# Patient Record
Sex: MALE | Race: WHITE | Employment: OTHER | URBAN - METROPOLITAN AREA
[De-identification: names, ages, dates, MRNs, and addresses within clinical notes are randomized per-mention and may not be internally consistent; named-entity substitution may affect disease eponyms.]

---

## 2023-02-01 RX ORDER — ASPIRIN 81 MG/1
81 TABLET ORAL DAILY
COMMUNITY

## 2023-02-01 RX ORDER — GLIMEPIRIDE 4 MG/1
4 TABLET ORAL 2 TIMES DAILY
COMMUNITY

## 2023-02-01 RX ORDER — LISINOPRIL AND HYDROCHLOROTHIAZIDE 20; 12.5 MG/1; MG/1
1 TABLET ORAL DAILY
COMMUNITY

## 2023-02-01 RX ORDER — ATORVASTATIN CALCIUM 40 MG/1
40 TABLET, FILM COATED ORAL DAILY
COMMUNITY

## 2023-02-01 NOTE — PROGRESS NOTES
Patient will be called with an arrival time on 2/6/2023 for his procedure at [address] [place] [address] on 2/7/2023               1. Do not eat or drink anything after midnight - unless instructed by your doctor prior to surgery. This includes                   no water, chewing gum or mints. 2. Follow your directions as prescribed by the doctor for your procedure and medications. 3. Check with your Doctor regarding stopping vitamins, supplements, blood thinners and follow their instructions. Stop vitamins, supplements and NSAIDS:    4. Do not smoke, vape or use chewing tobacco morning of surgery. Do not drink any alcoholic beverages 24 hours prior to surgery. This includes NA Beer. No street drugs 7 days prior to surgery. 5. You may brush your teeth and gargle the morning of surgery. DO NOT SWALLOW WATER   6. You MUST make arrangements for a responsible adult to take you home after your surgery and be able to check on you every couple                   hours for the day. You will not be allowed to leave alone or drive yourself home. It is strongly suggested someone stay with you the first 24                   hrs. Your surgery will be cancelled if you do not have a ride home. 7. Please wear simple, loose fitting clothing to the hospital.  Garcia Greco not bring valuables (money, credit cards, checkbooks, etc.) Do not wear any                   makeup (including no eye makeup) or nail polish on your fingers or toes. 8. DO NOT wear any jewelry or piercings on day of surgery. All body piercing jewelry must be removed. 9. If you have dentures, they will be removed before going to the OR; we will provide you a container. If you wear contact lenses or glasses,                  they will be removed; please bring a case for them. 10. If you  have a Living Will and Durable Power of  for Healthcare, please bring in a copy.            11. Please bring picture ID,  insurance card, paperwork from the doctors office    (H & P, Consent, & card for implantable devices). 12. Take a shower with Hibiclens or an antibacterial soap the night before your surgery (put clean sheets on your bed). Take a 2nd shower with the antibacterial soap the morning of surgery. Do not apply any make-up, deodorant, lotion, oil or powder after the    morning shower. 15.  Enter thru the main entrance on the day of surgery. Patient will take his metformin the morning of his procedure, he has no questions concerning colon prep instructions at this time.

## 2023-02-06 ENCOUNTER — ANESTHESIA EVENT (OUTPATIENT)
Dept: ENDOSCOPY | Age: 68
End: 2023-02-06
Payer: MEDICARE

## 2023-02-06 NOTE — PROGRESS NOTES
Left message for patient to arrive at 0900 at [facility] on 2/7/2023 for his procedure at 1030. IV order in epic, placed by Elsie ABBOTT.

## 2023-02-06 NOTE — ANESTHESIA PRE PROCEDURE
Department of Anesthesiology  Preprocedure Note       Name:  Lanny Ford   Age:  74 y.o.  :  1955                                          MRN:  [id number]         Date:  2023      Surgeon: Justyn Orta):  Adam Villalpando MD    Procedure: Procedure(s):  COLORECTAL CANCER SCREENING, NOT HIGH RISK    Medications prior to admission:   Prior to Admission medications    Medication Sig Start Date End Date Taking? Authorizing Provider   CINNAMON PO Take by mouth daily   Yes Historical Provider, MD   Potassium 99 MG TABS Take by mouth daily   Yes Historical Provider, MD   MULTIPLE VITAMIN PO Take by mouth daily   Yes Historical Provider, MD   aspirin 81 MG EC tablet Take 81 mg by mouth daily   Yes Historical Provider, MD   atorvastatin (LIPITOR) 40 MG tablet Take 40 mg by mouth daily   Yes Historical Provider, MD   dapagliflozin (FARXIGA) 5 MG tablet Take 5 mg by mouth every morning   Yes Historical Provider, MD   glimepiride (AMARYL) 4 MG tablet Take 4 mg by mouth 2 times daily   Yes Historical Provider, MD   SITagliptin (JANUVIA) 100 MG tablet Take 100 mg by mouth daily   Yes Historical Provider, MD   lisinopril-hydroCHLOROthiazide (PRINZIDE;ZESTORETIC) 20-12.5 MG per tablet Take 1 tablet by mouth daily   Yes Historical Provider, MD   metFORMIN (GLUCOPHAGE) 850 MG tablet Take 850 mg by mouth 2 times daily (with meals)   Yes Historical Provider, MD       Current medications:    No current facility-administered medications for this encounter.      Current Outpatient Medications   Medication Sig Dispense Refill    CINNAMON PO Take by mouth daily      Potassium 99 MG TABS Take by mouth daily      MULTIPLE VITAMIN PO Take by mouth daily      aspirin 81 MG EC tablet Take 81 mg by mouth daily      atorvastatin (LIPITOR) 40 MG tablet Take 40 mg by mouth daily      dapagliflozin (FARXIGA) 5 MG tablet Take 5 mg by mouth every morning      glimepiride (AMARYL) 4 MG tablet Take 4 mg by mouth 2 times daily      SITagliptin (JANUVIA) 100 MG tablet Take 100 mg by mouth daily      lisinopril-hydroCHLOROthiazide (PRINZIDE;ZESTORETIC) 20-12.5 MG per tablet Take 1 tablet by mouth daily      metFORMIN (GLUCOPHAGE) 850 MG tablet Take 850 mg by mouth 2 times daily (with meals)         Allergies: Allergies   Allergen Reactions    Bee Pollen Anaphylaxis    Epinephrine      Hives and rash.  Other      Strawberries cause a rash. Problem List:  There is no problem list on file for this patient. Past Medical History:        Diagnosis Date    Diabetes mellitus ([address]     Hyperlipidemia     Hypertension     Sleep apnea        Past Surgical History:        Procedure Laterality Date    APPENDECTOMY      HAND SURGERY Left        Social History:    Social History     Tobacco Use    Smoking status: Former     Types: Cigarettes    Smokeless tobacco: Never   Substance Use Topics    Alcohol use: Yes     Comment: rare                                Counseling given: Not Answered      Vital Signs (Current):   Vitals:    02/01/23 1036   Weight: 290 lb (131.5 kg)   Height: 5' 9\" (1.753 m)                                              BP Readings from Last 3 Encounters:   No data found for BP       NPO Status:                                                                                 BMI:   Wt Readings from Last 3 Encounters:   No data found for Wt     Body mass index is 42.83 kg/m². CBC: No results found for: WBC, RBC, HGB, HCT, MCV, RDW, PLT    CMP: No results found for: NA, K, CL, CO2, BUN, CREATININE, GFRAA, AGRATIO, LABGLOM, GLUCOSE, GLU, PROT, CALCIUM, BILITOT, ALKPHOS, AST, ALT    POC Tests: No results for input(s): POCGLU, POCNA, POCK, POCCL, POCBUN, POCHEMO, POCHCT in the last 72 hours.     Coags: No results found for: PROTIME, INR, APTT    HCG (If Applicable): No results found for: PREGTESTUR, PREGSERUM, HCG, HCGQUANT     ABGs: No results found for: PHART, PO2ART, PGE7NKT, NYV2AOT, BEART, L9IYMDDK     Type & Screen (If Applicable):  No results found for: LABABO, LABRH    Drug/Infectious Status (If Applicable):  No results found for: HIV, HEPCAB    COVID-19 Screening (If Applicable): No results found for: COVID19        Anesthesia Evaluation   history of anesthetic complications:   Airway: Mallampati: I  TM distance: >3 FB   Neck ROM: full  Mouth opening: > = 3 FB   Dental:    (+) poor dentition      Pulmonary:normal exam    (+) sleep apnea:                             Cardiovascular:  Exercise tolerance: good (>4 METS),   (+) hypertension:, hyperlipidemia         Beta Blocker:  Not on Beta Blocker         Neuro/Psych:               GI/Hepatic/Renal:   (+) bowel prep, morbid obesity          Endo/Other:    (+) Diabetes, . Abdominal:             Vascular: Other Findings:           Anesthesia Plan      MAC     ASA 3       Induction: intravenous. Anesthetic plan and risks discussed with patient.                         JULIO Rogers - CRNA   2/6/2023

## 2023-02-07 ENCOUNTER — HOSPITAL ENCOUNTER (OUTPATIENT)
Age: 68
Setting detail: OUTPATIENT SURGERY
Discharge: HOME | End: 2023-02-07
Attending: INTERNAL MEDICINE | Admitting: INTERNAL MEDICINE
Payer: MEDICARE

## 2023-02-07 ENCOUNTER — ANESTHESIA (OUTPATIENT)
Dept: ENDOSCOPY | Age: 68
End: 2023-02-07
Payer: MEDICARE

## 2023-02-07 VITALS
HEIGHT: 69 IN | RESPIRATION RATE: 18 BRPM | DIASTOLIC BLOOD PRESSURE: 53 MMHG | BODY MASS INDEX: 41.77 KG/M2 | WEIGHT: 282 LBS | OXYGEN SATURATION: 95 % | TEMPERATURE: 98.2 F | HEART RATE: 79 BPM | SYSTOLIC BLOOD PRESSURE: 109 MMHG

## 2023-02-07 DIAGNOSIS — G47.30 SLEEP APNEA, UNSPECIFIED TYPE: ICD-10-CM

## 2023-02-07 DIAGNOSIS — Z12.11 SCREEN FOR COLON CANCER: ICD-10-CM

## 2023-02-07 LAB — GLUCOSE BLD-MCNC: 167 MG/DL (ref 70–99)

## 2023-02-07 PROCEDURE — 82962 GLUCOSE BLOOD TEST: CPT

## 2023-02-07 PROCEDURE — 7100000011 HC PHASE II RECOVERY - ADDTL 15 MIN: Performed by: INTERNAL MEDICINE

## 2023-02-07 PROCEDURE — 7100000010 HC PHASE II RECOVERY - FIRST 15 MIN: Performed by: INTERNAL MEDICINE

## 2023-02-07 PROCEDURE — 6360000002 HC RX W HCPCS: Performed by: NURSE ANESTHETIST, CERTIFIED REGISTERED

## 2023-02-07 PROCEDURE — 3609010600 HC COLONOSCOPY POLYPECTOMY SNARE/COLD BIOPSY: Performed by: INTERNAL MEDICINE

## 2023-02-07 PROCEDURE — 3700000000 HC ANESTHESIA ATTENDED CARE: Performed by: INTERNAL MEDICINE

## 2023-02-07 PROCEDURE — 2500000003 HC RX 250 WO HCPCS: Performed by: NURSE ANESTHETIST, CERTIFIED REGISTERED

## 2023-02-07 PROCEDURE — 2709999900 HC NON-CHARGEABLE SUPPLY: Performed by: INTERNAL MEDICINE

## 2023-02-07 PROCEDURE — 88305 TISSUE EXAM BY PATHOLOGIST: CPT | Performed by: PATHOLOGY

## 2023-02-07 PROCEDURE — 3700000001 HC ADD 15 MINUTES (ANESTHESIA): Performed by: INTERNAL MEDICINE

## 2023-02-07 PROCEDURE — 2580000003 HC RX 258: Performed by: ANESTHESIOLOGY

## 2023-02-07 RX ORDER — PROPOFOL 10 MG/ML
INJECTION, EMULSION INTRAVENOUS PRN
Status: DISCONTINUED | OUTPATIENT
Start: 2023-02-07 | End: 2023-02-07 | Stop reason: SDUPTHER

## 2023-02-07 RX ORDER — LIDOCAINE HYDROCHLORIDE 20 MG/ML
INJECTION, SOLUTION INFILTRATION; PERINEURAL PRN
Status: DISCONTINUED | OUTPATIENT
Start: 2023-02-07 | End: 2023-02-07 | Stop reason: SDUPTHER

## 2023-02-07 RX ORDER — SODIUM CHLORIDE, SODIUM LACTATE, POTASSIUM CHLORIDE, CALCIUM CHLORIDE 600; 310; 30; 20 MG/100ML; MG/100ML; MG/100ML; MG/100ML
INJECTION, SOLUTION INTRAVENOUS CONTINUOUS
Status: DISCONTINUED | OUTPATIENT
Start: 2023-02-07 | End: 2023-02-07 | Stop reason: HOSPADM

## 2023-02-07 RX ADMIN — LIDOCAINE HYDROCHLORIDE 100 MG: 20 INJECTION, SOLUTION INFILTRATION; PERINEURAL at 10:19

## 2023-02-07 RX ADMIN — PROPOFOL 200 MG: 10 INJECTION, EMULSION INTRAVENOUS at 10:19

## 2023-02-07 RX ADMIN — PHENYLEPHRINE HYDROCHLORIDE 50 MCG: 10 INJECTION INTRAVENOUS at 10:29

## 2023-02-07 RX ADMIN — PHENYLEPHRINE HYDROCHLORIDE 50 MCG: 10 INJECTION INTRAVENOUS at 10:27

## 2023-02-07 RX ADMIN — SODIUM CHLORIDE, POTASSIUM CHLORIDE, SODIUM LACTATE AND CALCIUM CHLORIDE: 600; 310; 30; 20 INJECTION, SOLUTION INTRAVENOUS at 09:42

## 2023-02-07 RX ADMIN — PHENYLEPHRINE HYDROCHLORIDE 50 MCG: 10 INJECTION INTRAVENOUS at 10:25

## 2023-02-07 ASSESSMENT — PAIN - FUNCTIONAL ASSESSMENT: PAIN_FUNCTIONAL_ASSESSMENT: 0-10

## 2023-02-07 ASSESSMENT — PAIN SCALES - GENERAL: PAINLEVEL_OUTOF10: 0

## 2023-02-07 NOTE — DISCHARGE INSTRUCTIONS
[facility]  [phone]    Do not drive, work around [address] [place], [place] [address] or use equipment. Do not drink any alcoholic beverages. Do not smoke while alone. Avoid making important decisions. Plan to spend a quiet, relaxed evening @ home. Resume normal activities as you begin to feel better. Eat lightly for your first meal, then gradually increase your diet to what is normal for you. In case of nausea, avoid food and drink only clear liquids. Resume food as nausea ceases. Notify your surgeon if you experience fever, chills, large amount of bleeding, difficulty breathing, persistent nausea and vomiting or any other disturbing problem. Call for a follow-up appointment with your surgeon. COLONOSCOPY       OFFICE NUMBER [phone]_    FOLLOW UP APPOINTMENT  2 WEEKS OR AS NEEDED. REPEAT PROCEDURE AS NEEDED. TEST ORDERED:  BIOPSY    What to expect at home: Your Recovery   Your doctor will tell you when you can eat and do your other usual activities Your doctor will talk to you about when you will need your next colonoscopy. Your doctor can help you decide how often you need to be checked. This will depend on the results of your test and your risk for colorectal cancer. After the test, you may be bloated or have gas pains. You may need to pass gas. If a biopsy was done or a polyp was removed, you may have streaks of blood in your stool (feces) for a few days. This care sheet gives you a general idea about how long it will take for you to recover. But each person recovers at a different pace. Follow the steps below to get better as quickly as possible. How can you care for yourself at home? Activity  Rest when you feel tired. Diet  Follow your doctor's directions for eating. Unless your doctor has told you not to, drink plenty of fluids. This helps to replace the fluids that were lost during the colon prep. DO NOT DRINK ALCOHOL.   Medicines  Your doctor will tell you if and when you can restart your medicines. He or she will also give you instructions about taking any new medicines. If you take blood thinners, such as warfarin (Coumadin), clopidogrel (Plavix), or aspirin, be sure to talk to your doctor. He or she will tell you if and when to start taking those medicines again. Make sure that you understand exactly what your doctor wants you to do. If polyps were removed or a biopsy was done during the test, your doctor may tell you not to take aspirin or other anti-inflammatory medicines for a few days. These include ibuprofen (Advil, Motrin) and naproxen (Aleve). Other instructions: Anethesia  For your safety, do not drive or operate machinery for 24 hours. Do not sign legal documents or make major decisions for 24 hours. The anesthesia can make it hard for you to fully understand what you are agreeing to. Follow-up care is a key part of your treatment and safety. Be sure to make and go to all appointments, and call your doctor if you are having problems. It's also a good idea to know your test results and keep a list of the medicines you take. When should you call for help? [facility] Loretta Nias [phone]  Call 911 anytime you think you may need emergency care. For example, call if:  You passed out (lost consciousness). You pass maroon or bloody stools. You have severe belly pain. Call your doctor now or seek immediate medical care if:  Your stools are black and tarlike. Your stools have streaks of blood, but you did not have a biopsy or any polyps removed. You have belly pain, or your belly is swollen and firm. You vomit. You have a fever. You are very dizzy. Watch closely for changes in your health, and be sure to contact your doctor if you have any problems. Where can you learn more? Go to [email or web address] org and sign in to your [employer] account.  Enter E264 in the Search Health Information box to learn more about Colonoscopy: What to Expect at Home.     If you do not have an account, please click on the Sign Up Now link. © 7859-0247 Healthwise, Incorporated. Care instructions adapted under license by [facility] ([facility]). This care instruction is for use with your licensed healthcare professional. If you have questions about a medical condition or this instruction, always ask your healthcare professional. [place] [address] any warranty or liability for your use of this information.   Content Version: 57.5.097077; Current as of: November 20, 2015

## 2023-02-07 NOTE — PROGRESS NOTES
1042- Patient arrived back to [facility]. Report given to this nurse from  [facility] RN Patient A&O. Beverage of choice offered to patient. Call light in reach and bed in lowest position. 1108- IV removed. Discharge instructions given to  patient and his sister both verbalized understanding. 1112- Patient sitting on side of bed getting dressed   1130- Patient escorted to car via wheelchair transported home by sister.

## 2023-02-07 NOTE — H&P
[facility]   Pre-operative History and Physical    Patient: Holley Postal  : 1955      History Obtained From:  patient        HISTORY OF PRESENT ILLNESS:                The patient is a 74 y.o. male with significant past medical history as below who presents for C scope    Indication screening colonoscopy    Past Medical History:        Diagnosis Date    Diabetes mellitus ([facility] [address]     Hyperlipidemia     Hypertension     Sleep apnea        Past Surgical History:        Procedure Laterality Date    APPENDECTOMY      HAND SURGERY Left          Current Medications:    Medications    Prior to Admission medications    Medication Sig Start Date End Date Taking? Authorizing Provider   CINNAMON PO Take by mouth daily   Yes Historical Provider, MD   Potassium 99 MG TABS Take by mouth daily   Yes Historical Provider, MD   MULTIPLE VITAMIN PO Take by mouth daily   Yes Historical Provider, MD   aspirin 81 MG EC tablet Take 81 mg by mouth daily   Yes Historical Provider, MD   atorvastatin (LIPITOR) 40 MG tablet Take 40 mg by mouth daily   Yes Historical Provider, MD   dapagliflozin (FARXIGA) 5 MG tablet Take 5 mg by mouth every morning   Yes Historical Provider, MD   glimepiride (AMARYL) 4 MG tablet Take 4 mg by mouth 2 times daily   Yes Historical Provider, MD   SITagliptin (JANUVIA) 100 MG tablet Take 100 mg by mouth daily   Yes Historical Provider, MD   lisinopril-hydroCHLOROthiazide (PRINZIDE;ZESTORETIC) 20-12.5 MG per tablet Take 1 tablet by mouth daily   Yes Historical Provider, MD   metFORMIN (GLUCOPHAGE) 850 MG tablet Take 850 mg by mouth 2 times daily (with meals)   Yes Historical Provider, MD      Scheduled Medications:   Infusions:    lactated ringers IV soln       PRN Medications: Allergies: Allergies   Allergen Reactions    Bee Pollen Anaphylaxis    Epinephrine      Hives and rash. Other      Strawberries cause a rash.          Allergies:  Bee pollen, Epinephrine, and Other    Social History:   TOBACCO:   reports that he has quit smoking. His smoking use included cigarettes. He has never used smokeless tobacco.  ETOH:   reports current alcohol use. Family History:       Problem Relation Age of Onset    Other Mother     Diabetes Father        REVIEW OF SYSTEMS:    Negative except for HPI        PHYSICAL EXAM:      Vitals:    BP (!) 149/70   Pulse 85   Resp 18   Ht 5' 9\" (1.753 m)   Wt 282 lb (127.9 kg)   SpO2 96%   BMI 41.64 kg/m²     General Appearance:    Alert, cooperative, no distress, appears stated age   [de-identified]:    NO anemia, No jaundice , no Cyanosis, Supple neck   Neck:   Supple, symmetrical, trachea midline, no adenopathy;     thyroid:    Lungs:     Clear to auscultation bilaterally, respirations unlabored   Chest Wall:    No tenderness or deformity    Heart:    Regular rate and rhythm, S1 and S2 normal, no murmur, rub   or gallop   Abdomen:     Soft, non-tender, bowel sounds active all four quadrants,     no masses, no organomegaly, no ascitis   Rectal:    Planned at time of C scope   Extremities:   Extremities normal, atraumatic, no cyanosis or edema   Pulses:   2+ and symmetric all extremities   Skin:   Skin color, texture, turgor normal, no rashes or lesions   Lymph nodes:   No abnormality   Neurologic:   No focal deficits, moving all four extremities      ASA Grade:  ASA 3 - Patient with moderate systemic disease with functional limitations  Air Way:    Prior Anesthesia complication: NILL    ASSESSMENT AND PLAN:    1. Patient is a 74 y.o. male here for colonoscopy with deep sedation  2. Procedure options, risks and benefits reviewed with patient. Patient expresses understanding.     Mariana Lynch MD  2/7/2023  9:28 AM

## 2023-02-07 NOTE — OP NOTE
Operative Note      Patient: Carisa Black  YOB: 1955  MRN: [id number]    Date of Procedure: 2/7/2023    Pre-Op Diagnosis: Screen for colon cancer [Z12.11]  Sleep apnea, unspecified type [G47.30]    [facility]      BRIEF OP REPORT:    Impression:    1) mild sigmoid and descending colon diverticulosis and grade 2 internal hemorrhoids   2) small polyp descending colon 4 mm cold snare and retrieved   3) rest of colonoscopy nondiagnostic        Suggest:   1) high-fiber diet, probiotics every day, 64 ounces of water every day   2) follow-up as needed   3) recall in 5 years     Full EGD/COLONOSCOPY report available by going to \"chart review\" then \"procedures\" then  \"EGD/Colonoscopy\"  then \"View Endoscopy Report\"      Electronically signed by Lorna Saunders MD on 2/7/2023 at 10:36 AM

## 2023-02-07 NOTE — ANESTHESIA POSTPROCEDURE EVALUATION
Department of Anesthesiology  Postprocedure Note    Patient: Melissa Morataya  MRN: [id number]  YOB: 1955  Date of evaluation: 2/7/2023      Procedure Summary     Date: 02/07/23 Room / Location: [address]    Anesthesia Start: 1015 Anesthesia Stop: 9946    Procedure: COLONOSCOPY POLYPECTOMY SNARE/COLD BIOPSY Diagnosis:       Screen for colon cancer      Sleep apnea, unspecified type      (Screen for colon cancer [Z12.11])      (Sleep apnea, unspecified type [G47.30])    Surgeons: Sharon Wright MD Responsible Provider: Anil Rivera MD    Anesthesia Type: MAC ASA Status: 3          Anesthesia Type: No value filed. Brayan Phase I: Brayan Score: 10    Brayan Phase II:  10      Anesthesia Post Evaluation    Patient location during evaluation: bedside  Patient participation: complete - patient participated  Level of consciousness: awake and alert  Pain score: 0  Airway patency: patent  Nausea & Vomiting: no nausea and no vomiting  Complications: no  Cardiovascular status: hemodynamically stable  Respiratory status: acceptable, room air, spontaneous ventilation and nonlabored ventilation  Hydration status: euvolemic    loose tooth fell out post procedure onto pillow.  Tooth placed in bag and given to patient

## 2024-05-12 ENCOUNTER — HOSPITAL ENCOUNTER (EMERGENCY)
Age: 69
Discharge: HOME | End: 2024-05-12
Attending: EMERGENCY MEDICINE
Payer: MEDICARE

## 2024-05-12 VITALS
DIASTOLIC BLOOD PRESSURE: 76 MMHG | SYSTOLIC BLOOD PRESSURE: 148 MMHG | OXYGEN SATURATION: 100 % | HEART RATE: 89 BPM | TEMPERATURE: 97.6 F | BODY MASS INDEX: 41.03 KG/M2 | RESPIRATION RATE: 18 BRPM | HEIGHT: 69 IN | WEIGHT: 277 LBS

## 2024-05-12 DIAGNOSIS — L02.91 ABSCESS: Primary | ICD-10-CM

## 2024-05-12 PROCEDURE — 10060 I&D ABSCESS SIMPLE/SINGLE: CPT

## 2024-05-12 PROCEDURE — 6370000000 HC RX 637 (ALT 250 FOR IP): Performed by: EMERGENCY MEDICINE

## 2024-05-12 PROCEDURE — 99283 EMERGENCY DEPT VISIT LOW MDM: CPT

## 2024-05-12 RX ORDER — DOXYCYCLINE HYCLATE 100 MG
100 TABLET ORAL ONCE
Status: COMPLETED | OUTPATIENT
Start: 2024-05-12 | End: 2024-05-12

## 2024-05-12 RX ORDER — FINASTERIDE 5 MG/1
5 TABLET, FILM COATED ORAL DAILY
COMMUNITY

## 2024-05-12 RX ORDER — OMEPRAZOLE 40 MG/1
40 CAPSULE, DELAYED RELEASE ORAL DAILY
COMMUNITY

## 2024-05-12 RX ORDER — SEMAGLUTIDE 0.68 MG/ML
INJECTION, SOLUTION SUBCUTANEOUS
COMMUNITY

## 2024-05-12 RX ORDER — DOXYCYCLINE HYCLATE 100 MG
100 TABLET ORAL 2 TIMES DAILY
Qty: 20 TABLET | Refills: 0 | Status: SHIPPED | OUTPATIENT
Start: 2024-05-12 | End: 2024-05-22

## 2024-05-12 RX ADMIN — DOXYCYCLINE HYCLATE 100 MG: 100 TABLET, COATED ORAL at 14:31

## 2024-05-12 ASSESSMENT — PAIN - FUNCTIONAL ASSESSMENT: PAIN_FUNCTIONAL_ASSESSMENT: 0-10

## 2024-05-12 ASSESSMENT — PAIN DESCRIPTION - FREQUENCY: FREQUENCY: CONTINUOUS

## 2024-05-12 ASSESSMENT — PAIN DESCRIPTION - PAIN TYPE: TYPE: ACUTE PAIN

## 2024-05-12 ASSESSMENT — PAIN SCALES - GENERAL: PAINLEVEL_OUTOF10: 7

## 2024-05-12 ASSESSMENT — PAIN DESCRIPTION - DESCRIPTORS: DESCRIPTORS: THROBBING

## 2024-05-12 ASSESSMENT — PAIN DESCRIPTION - ORIENTATION: ORIENTATION: RIGHT;POSTERIOR

## 2024-05-12 ASSESSMENT — LIFESTYLE VARIABLES
HOW MANY STANDARD DRINKS CONTAINING ALCOHOL DO YOU HAVE ON A TYPICAL DAY: PATIENT DOES NOT DRINK
HOW OFTEN DO YOU HAVE A DRINK CONTAINING ALCOHOL: NEVER

## 2024-05-12 ASSESSMENT — PAIN DESCRIPTION - LOCATION: LOCATION: HEAD

## 2024-05-12 NOTE — ED NOTES
Discharge instructions and prescriptions were reviewed and the patient will follow up with the PCP. The patient voiced understanding.

## 2024-05-12 NOTE — ED TRIAGE NOTES
Pt states has head boil that he tried to get to pop for about 4 days. He also has a similar issue on his right inner thigh as well x3.

## 2024-05-12 NOTE — DISCHARGE INSTR - COC
/ NO:}  Bladder: {YES / NO:}  Urinary Catheter: {Urinary Catheter:897364846}   Colostomy/Ileostomy/Ileal Conduit: {YES / NO:}       Date of Last BM: ***  No intake or output data in the 24 hours ending 24 1443  No intake/output data recorded.    Safety Concerns:     { SAYDA Safety Concerns:537164473}    Impairments/Disabilities:      { SAYDA Impairments/Disabilities:945792292}    Nutrition Therapy:  Current Nutrition Therapy:   { SAYDA Diet List:014063907}    Routes of Feeding: {[facility] D[facility] Other Feedings:484591287}  Liquids: {Slp liquid thickness:93026}  Daily Fluid Restriction: {[facility] DME Yes amt example:649763694}  Last Modified Barium Swallow with Video (Video Swallowing Test): {Done Not Done Date:000955176}    Treatments at the Time of Hospital Discharge:   Respiratory Treatments: ***  Oxygen Therapy:  {Therapy; copd oxygen:72844}  Ventilator:    {[facility] Vent List:276276041}    Rehab Therapies: {THERAPEUTIC INTERVENTION:1590189791}  Weight Bearing Status/Restrictions: {[facility] Weight Bearin}  Other Medical Equipment (for information only, NOT a DME order):  {EQUIPMENT:212944929}  Other Treatments: ***    Patient's personal belongings (please select all that are sent with patient):  {[facility] DME Belongings:057678589}    RN SIGNATURE:  {Esignature:815900650}    CASE MANAGEMENT/SOCIAL WORK SECTION    Inpatient Status Date: ***    Readmission Risk Assessment Score:  Readmission Risk              Risk of Unplanned Readmission:  0           Discharging to Facility/ Agency   Name:   Address:  Phone:  Fax:    Dialysis Facility (if applicable)   Name:  Address:  Dialysis Schedule:  Phone:  Fax:    / signature: {Esignature:948182149}    PHYSICIAN SECTION    Prognosis: {Prognosis:4288982098}    Condition at Discharge: { Patient Condition:096032595}    Rehab Potential (if transferring to Rehab): {Prognosis:6426625657}    Recommended Labs or Other Treatments After Discharge:

## 2024-05-13 NOTE — ED PROVIDER NOTES
Flow       Pain Score       Pain Loc       Pain Edu?       Excl. in GC?      GENERAL APPEARANCE: Awake and alert. Cooperative. No acute distress.  Pleasant, comfortable and overall well-appearing.  HEAD: Normocephalic.  Patient has an area (4 cm in diameter) of tender induration and palpable fluctuance on the posterior scalp, midline.  Small central eschar without active drainage.  Minimal surrounding erythema with no surrounding induration or tenderness.    EYES: EOM's grossly intact. Sclera anicteric.  PERRLA.  ENT: Tolerates saliva. No trismus.   NECK: Supple. Trachea midline.   CARDIO: RRR. Radial pulse 2+.   LUNGS: Respirations unlabored. CTAB.  ABDOMEN: Soft. Non-distended. Non-tender.    EXTREMITIES: No acute deformities.  Patient with approximately 3 cm area of induration and erythema involving his right inner thigh.  Central eschar with small active drainage.  The lesion is well-circumscribed without surrounding erythema.  No extension of the erythema or warmth into the inguinal region or perineum.  No crepitus.  SKIN: Warm and dry.   NEUROLOGICAL: No gross facial drooping. Moves all 4 extremities spontaneously.   PSYCHIATRIC: Normal mood.       CC/HPI Summary, DDx, ED Course, and Reassessment:   Patient with 2 abscesses on assessment.  The area on the thigh appears to be draining a little bit already.  Recommended that we proceed with I&D and then begin antibiotics.  Patient is agreeable to proceed.    Procedure Note - Incision and Drainage:  Questions were sought and answered and verbal consent was given by patient for the procedure. The area was prepped and draped in a standard bedside fashion. The scalp area was anesthetized with 3ml of Lidocaine 1% without epinephrine. An 11 blade surgical scalpel was used to make a 1cm incision over the area of maximum fluctulance.  Moderate purulence was expressed.  The wound was irrigated with normal saline. The patient tolerated the procedure well without

## 2025-01-08 ENCOUNTER — HOSPITAL ENCOUNTER (EMERGENCY)
Age: 70
Discharge: HOME | End: 2025-01-08
Attending: EMERGENCY MEDICINE
Payer: MEDICARE

## 2025-01-08 VITALS
HEART RATE: 95 BPM | WEIGHT: 265 LBS | BODY MASS INDEX: 39.25 KG/M2 | DIASTOLIC BLOOD PRESSURE: 87 MMHG | RESPIRATION RATE: 18 BRPM | TEMPERATURE: 98.5 F | OXYGEN SATURATION: 97 % | SYSTOLIC BLOOD PRESSURE: 137 MMHG | HEIGHT: 69 IN

## 2025-01-08 DIAGNOSIS — L03.119 CELLULITIS AND ABSCESS OF LEG: Primary | ICD-10-CM

## 2025-01-08 DIAGNOSIS — L02.419 CELLULITIS AND ABSCESS OF LEG: Primary | ICD-10-CM

## 2025-01-08 PROCEDURE — 87077 CULTURE AEROBIC IDENTIFY: CPT

## 2025-01-08 PROCEDURE — 87186 SC STD MICRODIL/AGAR DIL: CPT

## 2025-01-08 PROCEDURE — 10060 I&D ABSCESS SIMPLE/SINGLE: CPT

## 2025-01-08 PROCEDURE — 87205 SMEAR GRAM STAIN: CPT

## 2025-01-08 PROCEDURE — 99283 EMERGENCY DEPT VISIT LOW MDM: CPT

## 2025-01-08 PROCEDURE — 87070 CULTURE OTHR SPECIMN AEROBIC: CPT

## 2025-01-08 RX ORDER — HYDROCODONE BITARTRATE AND ACETAMINOPHEN 5; 325 MG/1; MG/1
1 TABLET ORAL EVERY 6 HOURS PRN
Qty: 12 TABLET | Refills: 0 | Status: SHIPPED | OUTPATIENT
Start: 2025-01-08 | End: 2025-01-11

## 2025-01-08 RX ORDER — LIDOCAINE HYDROCHLORIDE 10 MG/ML
5 INJECTION, SOLUTION EPIDURAL; INFILTRATION; INTRACAUDAL; PERINEURAL ONCE
Status: DISCONTINUED | OUTPATIENT
Start: 2025-01-08 | End: 2025-01-08 | Stop reason: HOSPADM

## 2025-01-08 RX ORDER — HYDROCODONE BITARTRATE AND ACETAMINOPHEN 5; 325 MG/1; MG/1
1 TABLET ORAL EVERY 6 HOURS PRN
Qty: 12 TABLET | Refills: 0 | Status: SHIPPED | OUTPATIENT
Start: 2025-01-08 | End: 2025-01-08

## 2025-01-08 RX ORDER — DOXYCYCLINE HYCLATE 100 MG
100 TABLET ORAL 2 TIMES DAILY
Qty: 20 TABLET | Refills: 0 | Status: SHIPPED | OUTPATIENT
Start: 2025-01-08 | End: 2025-01-18

## 2025-01-08 ASSESSMENT — PAIN - FUNCTIONAL ASSESSMENT: PAIN_FUNCTIONAL_ASSESSMENT: NONE - DENIES PAIN

## 2025-01-08 ASSESSMENT — PAIN SCALES - GENERAL: PAINLEVEL_OUTOF10: 0

## 2025-01-08 NOTE — ED PROVIDER NOTES
EMERGENCY DEPARTMENT ENCOUNTER      CHIEF COMPLAINT:   Abscess    HPI: Dani Morales is a 69 y.o. male who presents to the emergency department, for evaluation of an abscess to the left posterior thigh.  The patient states he started to notice the area getting red and warm on Friday.  It became progressively bigger.  He put a salve on it that he has from the pharmacy that helps dry out infection.  He has been putting honey on it.  He states that it opened and started draining today.  He states it is painful.  It feels achy and sore.  The pain is constant.  It is worse if you touch it and better if you leave it alone.  He has a history of diabetes.  He has had abscesses in the past that have needed to be drained.  He denies a history of MRSA.  He denies fevers, chills, chest pain, shortness of breath, nausea, vomiting or any other complaints.    REVIEW OF SYSTEMS:   Constitutional:  Denies fever or chills  Eyes:  Denies change in visual acuity  HENT:  Denies nasal congestion or sore throat  Respiratory:  Denies cough or shortness of breath  Cardiovascular:  Denies chest pain or edema  GI:  Denies abdominal pain, nausea, vomiting, bloody stools or diarrhea  :  Denies dysuria  Musculoskeletal:  Denies back pain or joint pain  Integument: See HPI  Neurologic:  Denies headache, focal weakness or sensory changes  \"Remaining review of systems reviewed and negative. I have reviewed the nursing triage documentation and agree unless otherwise noted below.\"      PAST MEDICAL HISTORY:   Past Medical History:   Diagnosis Date    Diabetes mellitus (HCC)     Hyperlipidemia     Hypertension     Sleep apnea        CURRENT MEDICATIONS:   Home medications reviewed.    SURGICAL HISTORY:   Past Surgical History:   Procedure Laterality Date    APPENDECTOMY      COLONOSCOPY N/A 2/7/2023    COLONOSCOPY POLYPECTOMY SNARE/COLD BIOPSY performed by Ildefonso Padgett MD at [facility] ENDOSCOPY    HAND SURGERY Left        FAMILY HISTORY:   Family History

## 2025-01-10 LAB
MICROORGANISM SPEC CULT: ABNORMAL
MICROORGANISM/AGENT SPEC: ABNORMAL
SPECIMEN DESCRIPTION: ABNORMAL

## 2025-01-11 ENCOUNTER — HOSPITAL ENCOUNTER (EMERGENCY)
Age: 70
Discharge: HOME | End: 2025-01-11
Attending: EMERGENCY MEDICINE
Payer: MEDICARE

## 2025-01-11 VITALS
RESPIRATION RATE: 17 BRPM | HEIGHT: 69 IN | WEIGHT: 265 LBS | TEMPERATURE: 97.5 F | DIASTOLIC BLOOD PRESSURE: 74 MMHG | BODY MASS INDEX: 39.25 KG/M2 | HEART RATE: 92 BPM | OXYGEN SATURATION: 96 % | SYSTOLIC BLOOD PRESSURE: 128 MMHG

## 2025-01-11 DIAGNOSIS — Z51.89 WOUND CHECK, ABSCESS: Primary | ICD-10-CM

## 2025-01-11 PROCEDURE — 99282 EMERGENCY DEPT VISIT SF MDM: CPT

## 2025-01-11 ASSESSMENT — PAIN - FUNCTIONAL ASSESSMENT
PAIN_FUNCTIONAL_ASSESSMENT: ACTIVITIES ARE NOT PREVENTED
PAIN_FUNCTIONAL_ASSESSMENT: 0-10

## 2025-01-11 ASSESSMENT — PAIN SCALES - GENERAL: PAINLEVEL_OUTOF10: 1

## 2025-01-11 ASSESSMENT — PAIN DESCRIPTION - DESCRIPTORS: DESCRIPTORS: ACHING

## 2025-01-11 ASSESSMENT — PAIN DESCRIPTION - FREQUENCY: FREQUENCY: CONTINUOUS

## 2025-01-11 ASSESSMENT — PAIN DESCRIPTION - ORIENTATION: ORIENTATION: RIGHT

## 2025-01-11 ASSESSMENT — PAIN DESCRIPTION - PAIN TYPE: TYPE: ACUTE PAIN

## 2025-01-11 ASSESSMENT — PAIN DESCRIPTION - LOCATION: LOCATION: LEG

## 2025-01-11 NOTE — DISCHARGE INSTRUCTIONS
Return to the emergency department in 2 days for reevaluation and wound check.  Continue doxycycline antibiotic as prescribed directed  Keep the wound clean and dry  Return to the emergency department any increased pus drainage discharge or redness pain or any worsening symptoms.

## (undated) DEVICE — Z DISCONTINUED NO SUB IDED TUBING ETCO2 AD L6.5FT NSL ORAL CVD PRNG NONFLARED TIP OVR

## (undated) DEVICE — ENDOSCOPY KIT: Brand: MEDLINE INDUSTRIES, INC.